# Patient Record
Sex: MALE | Race: OTHER
[De-identification: names, ages, dates, MRNs, and addresses within clinical notes are randomized per-mention and may not be internally consistent; named-entity substitution may affect disease eponyms.]

---

## 2018-06-01 ENCOUNTER — HOSPITAL ENCOUNTER (OUTPATIENT)
Dept: HOSPITAL 93 - MRI | Age: 83
Discharge: HOME | End: 2018-06-01
Attending: PSYCHIATRY & NEUROLOGY
Payer: COMMERCIAL

## 2018-06-01 DIAGNOSIS — I63.039: Primary | ICD-10-CM

## 2018-06-01 PROCEDURE — 70551 MRI BRAIN STEM W/O DYE: CPT

## 2019-03-02 ENCOUNTER — HOSPITAL ENCOUNTER (EMERGENCY)
Dept: HOSPITAL 93 - ER | Age: 84
Discharge: HOME | End: 2019-03-02
Payer: COMMERCIAL

## 2019-03-02 VITALS — WEIGHT: 142 LBS | BODY MASS INDEX: 24.24 KG/M2 | HEIGHT: 64 IN

## 2019-03-02 DIAGNOSIS — R10.31: Primary | ICD-10-CM

## 2020-01-14 ENCOUNTER — HOSPITAL ENCOUNTER (OUTPATIENT)
Dept: HOSPITAL 93 - TOM | Age: 85
Discharge: HOME | End: 2020-01-14
Attending: INTERNAL MEDICINE
Payer: COMMERCIAL

## 2020-01-14 DIAGNOSIS — R41.2: ICD-10-CM

## 2020-01-14 DIAGNOSIS — R51: ICD-10-CM

## 2020-01-14 DIAGNOSIS — R42: Primary | ICD-10-CM

## 2025-03-18 ENCOUNTER — HOSPITAL ENCOUNTER (INPATIENT)
Dept: HOSPITAL 93 - ER | Age: OVER 89
LOS: 4 days | Discharge: HOME | DRG: 812 | End: 2025-03-22
Attending: GENERAL PRACTICE | Admitting: GENERAL PRACTICE
Payer: COMMERCIAL

## 2025-03-18 VITALS — OXYGEN SATURATION: 100 %

## 2025-03-18 VITALS — BODY MASS INDEX: 22.16 KG/M2 | WEIGHT: 133 LBS | HEIGHT: 65 IN

## 2025-03-18 VITALS — OXYGEN SATURATION: 96 %

## 2025-03-18 DIAGNOSIS — D53.9: ICD-10-CM

## 2025-03-18 DIAGNOSIS — I10: ICD-10-CM

## 2025-03-18 DIAGNOSIS — N20.0: ICD-10-CM

## 2025-03-18 DIAGNOSIS — I48.0: ICD-10-CM

## 2025-03-18 DIAGNOSIS — B95.2: ICD-10-CM

## 2025-03-18 DIAGNOSIS — D69.6: ICD-10-CM

## 2025-03-18 DIAGNOSIS — E03.9: ICD-10-CM

## 2025-03-18 DIAGNOSIS — Z91.81: ICD-10-CM

## 2025-03-18 DIAGNOSIS — D64.9: Primary | ICD-10-CM

## 2025-03-18 DIAGNOSIS — D53.1: ICD-10-CM

## 2025-03-18 DIAGNOSIS — E87.6: ICD-10-CM

## 2025-03-18 DIAGNOSIS — R55: ICD-10-CM

## 2025-03-18 DIAGNOSIS — N39.0: ICD-10-CM

## 2025-03-18 DIAGNOSIS — J90: ICD-10-CM

## 2025-03-18 LAB
ALBUMIN SERPL-MCNC: 3 GM/DL (ref 3.4–5)
ALP SERPL-CCNC: 54 U/L (ref 50–136)
ALT SERPL-CCNC: 15 U/L (ref 12–78)
ANION GAP SERPL CALC-SCNC: 9 MMOL/L (ref 10–20)
APTT PPP: 25.4 SECONDS (ref 22–34)
BACTERIA UR QL AUTO: 412.2 UL (ref 0–1933)
BASOPHILS NFR BLD AUTO: 0.2 % (ref 0–1.5)
BILIRUB DIRECT SERPL-MCNC: 0.22 MG/DL (ref 0–0.2)
BILIRUB SERPL-MCNC: 0.58 MG/DL (ref 0.3–1.2)
BUN SERPL-MCNC: 17 MG/DL (ref 7–18)
BUN/CREAT SERPL: 17 (ref 7–25)
CALCIUM SERPL-MCNC: 8.5 MG/DL (ref 8.5–10.1)
CASTS # UR AUTO: 1.62 UL (ref 0–1.4)
CHLORIDE SERPL-SCNC: 112 MMOL/L (ref 98–107)
CO2 SERPL-SCNC: 25 MEQ/L (ref 21–32)
CREAT SERPL-MCNC: 1.01 MG/DL (ref 0.7–1.3)
EGFRCR SERPLBLD CKD-EPI 2021: 69.55 ML/MIN/{1.73_M2}
EOSINOPHIL NFR BLD AUTO: 0.2 % (ref 0–7)
EPI CELLS URNS QL MICRO: 9 UL (ref 0–38.8)
ERYTHROCYTE [DISTWIDTH] IN BLOOD BY AUTOMATED COUNT: 19.1 % (ref 11.5–14.5)
GLOBULIN SER CALC-MCNC: 3.2 G/DL (ref 2.4–3.5)
GLUCOSE SERPL-MCNC: 104 MG/DL (ref 65–100)
HCT VFR BLD AUTO: 13.7 % (ref 39–48)
HGB BLD-MCNC: 4.6 G/DL (ref 13–16)
INR PPP: 1.21
LYMPHOCYTES NFR BLD AUTO: 86.6 % (ref 12–44)
MCH RBC QN AUTO: 36.8 PG (ref 27–32)
MCHC RBC AUTO-ENTMCNC: 33.9 G/DL (ref 32–36)
MCV RBC AUTO: 109 FL (ref 80–100)
MONOCYTES NFR BLD AUTO: 4.1 % (ref 2–10)
NEUTROPHILS NFR BLD AUTO: 8.9 % (ref 47–76)
OSMOLALITY SERPL: 285 MOSM/KG (ref 275–295)
PH UR: 6 [PH] (ref 5–8)
PLATELET # BLD AUTO: 141 K/UL (ref 150–450)
PMV BLD AUTO: 8.7 FL (ref 7.2–11.1)
POTASSIUM SERPL-SCNC: 3.88 MEQ/L (ref 3.5–5.1)
PROT SERPL-MCNC: 6.2 GM/DL (ref 6.4–8.2)
PROTHROMBIN TIME: 13 SECONDS (ref 9–11.5)
RBC # BLD AUTO: 1.25 M/UL (ref 4–6)
RBC #/AREA URNS AUTO: 1.3 UL (ref 0–20.8)
SCC AG SERPL-SCNC: 16 U/L (ref 15–37)
SODIUM SERPL-SCNC: 142 MMOL/L (ref 136–145)
SP GR UR STRIP: 1.01 (ref 1–1.03)
UROBILINOGEN UR STRIP-MCNC: 1 E.U./DL
WBC # BLD AUTO: 10.9 K/UL (ref 4.5–11)
WBC URNS QL MICRO: 146.4 UL (ref 0–23.2)

## 2025-03-18 PROCEDURE — 30233N1 TRANSFUSION OF NONAUTOLOGOUS RED BLOOD CELLS INTO PERIPHERAL VEIN, PERCUTANEOUS APPROACH: ICD-10-PCS | Performed by: GENERAL PRACTICE

## 2025-03-18 PROCEDURE — BW28ZZZ COMPUTERIZED TOMOGRAPHY (CT SCAN) OF HEAD: ICD-10-PCS | Performed by: INTERNAL MEDICINE

## 2025-03-18 NOTE — NUR
SE ORIENTA A PACIENTE SOBRE TRATAMIENTO MEDICO, REFIERE ENTENDER. SE COLECTAN
MUESTRAS DE LABORATORIO Y SE CANALIZA A PACIENTE BAJO MEDIDAS ASEPTICAS. SE
ENTREGA ENVASE PARA U/A. SE NOTIFICA ESTUDIO ECHO PENDIENTE.

## 2025-03-18 NOTE — NUR
SE PADMINI CONSENTIMIENTO DE TRANSFUCION DE TRANSFUCION MD FIRMA CONSENTIMIENTO DE
TRANSFUCION. SE REQUISA 4 UNIDADES DE PRBC. SE PADMINI TUBOS PILOS. SE CANALIZA EN
DANIEL KHAN # 20. SE ORIENTA A FAMILIAR SOBRE EL PROCESO DE TRANSFUCION Y
ESTOS REFIERE ENTENDER

## 2025-03-18 NOTE — NUR
PACIENTE ALERTA Y ORIENTADA X3, SE ORIENTA SOBRE TRATAMIENTO MEDICO INDICA
ENTENDER Y ACEPTAR. SE REALIZA CANALIZACION BAJO MEDIDAS ASEPTICAS Y SE
ADMINISTRA MEDICAMENTO JESENIA ORDEN MEDICA.

## 2025-03-18 NOTE — NUR
PACIENTE ALERTA Y ORIENTADO X3.PACIENTE REFIERE MAREOS Y DEVILIDA CONTASTE.S/V
ESTABLE DENDRO DE VU CONDICION .SE LE REALZA EKG .PACIENTE EN ESPERA DE
EVALUACION MEDICA.

## 2025-03-19 VITALS — OXYGEN SATURATION: 98 %

## 2025-03-19 VITALS — OXYGEN SATURATION: 100 %

## 2025-03-19 VITALS — OXYGEN SATURATION: 90 %

## 2025-03-19 LAB
CHOLEST SERPL-MCNC: 84 MG/DL (ref 0–200)
CHOLEST/HDLC SERPL: 2 {RATIO} (ref 0–5)
HDLC SERPL-MCNC: 43 MG/DL (ref 40–60)
LDLC SERPL CALC-MCNC: 28 MG/DL (ref 0–130)
TRIGL SERPL-MCNC: 67 MG/DL (ref 0–150)
TSH SERPL-ACNC: 1.2 UIU/ML (ref 0.36–3.74)
VLDLC SERPL CALC-MCNC: 13 MG/DL (ref 0–39)

## 2025-03-19 PROCEDURE — BW21YZZ COMPUTERIZED TOMOGRAPHY (CT SCAN) OF ABDOMEN AND PELVIS USING OTHER CONTRAST: ICD-10-PCS | Performed by: GENERAL PRACTICE

## 2025-03-19 PROCEDURE — B24BZZZ ULTRASONOGRAPHY OF HEART WITH AORTA: ICD-10-PCS | Performed by: INTERNAL MEDICINE

## 2025-03-19 PROCEDURE — 4A12X4Z MONITORING OF CARDIAC ELECTRICAL ACTIVITY, EXTERNAL APPROACH: ICD-10-PCS | Performed by: GENERAL PRACTICE

## 2025-03-20 VITALS — OXYGEN SATURATION: 95 %

## 2025-03-20 VITALS — OXYGEN SATURATION: 94 %

## 2025-03-20 VITALS — OXYGEN SATURATION: 93 %

## 2025-03-20 VITALS — OXYGEN SATURATION: 90 %

## 2025-03-20 VITALS — OXYGEN SATURATION: 97 %

## 2025-03-20 LAB
ALBUMIN SERPL-MCNC: 2.8 GM/DL (ref 3.4–5)
ALP SERPL-CCNC: 44 U/L (ref 50–136)
ALT SERPL-CCNC: 28 U/L (ref 12–78)
ANION GAP SERPL CALC-SCNC: 10 MMOL/L (ref 10–20)
BASOPHILS NFR BLD AUTO: 0.1 % (ref 0–1.5)
BILIRUB SERPL-MCNC: 1.03 MG/DL (ref 0.3–1.2)
BUN SERPL-MCNC: 17 MG/DL (ref 7–18)
BUN/CREAT SERPL: 19 (ref 7–25)
CALCIUM SERPL-MCNC: 8.1 MG/DL (ref 8.5–10.1)
CHLORIDE SERPL-SCNC: 105 MMOL/L (ref 98–107)
CO2 SERPL-SCNC: 25 MEQ/L (ref 21–32)
CREAT SERPL-MCNC: 0.91 MG/DL (ref 0.7–1.3)
EGFRCR SERPLBLD CKD-EPI 2021: 78.45 ML/MIN/{1.73_M2}
EOSINOPHIL NFR BLD AUTO: 0 % (ref 0–7)
ERYTHROCYTE [DISTWIDTH] IN BLOOD BY AUTOMATED COUNT: 19.1 % (ref 11.5–14.5)
GLOBULIN SER CALC-MCNC: 2.7 G/DL (ref 2.4–3.5)
GLUCOSE SERPL-MCNC: 120 MG/DL (ref 65–100)
HCT VFR BLD AUTO: 24.4 % (ref 39–48)
HEMOCCULT STL QL IA: POSITIVE
HGB BLD-MCNC: 8.5 G/DL (ref 13–16)
LYMPHOCYTES NFR BLD AUTO: 52.8 % (ref 12–44)
MAGNESIUM SERPL-MCNC: 1.5 MG/DL (ref 1.8–2.4)
MCH RBC QN AUTO: 32.9 PG (ref 27–32)
MCHC RBC AUTO-ENTMCNC: 34.9 G/DL (ref 32–36)
MCV RBC AUTO: 94.5 FL (ref 80–100)
MONOCYTES NFR BLD AUTO: 5.1 % (ref 2–10)
NEUTROPHILS NFR BLD AUTO: 42 % (ref 47–76)
OSMOLALITY SERPL: 277 MOSM/KG (ref 275–295)
PHOSPHATE SERPL-MCNC: 3.4 MG/DL (ref 2.5–4.9)
PLATELET # BLD AUTO: 123 K/UL (ref 150–450)
PMV BLD AUTO: 9 FL (ref 7.2–11.1)
POTASSIUM SERPL-SCNC: 3.41 MEQ/L (ref 3.5–5.1)
PROT SERPL-MCNC: 5.5 GM/DL (ref 6.4–8.2)
RBC # BLD AUTO: 2.58 M/UL (ref 4–6)
SCC AG SERPL-SCNC: 57 U/L (ref 15–37)
SODIUM SERPL-SCNC: 137 MMOL/L (ref 136–145)
WBC # BLD AUTO: 7.1 K/UL (ref 4.5–11)

## 2025-03-21 VITALS — OXYGEN SATURATION: 99 %

## 2025-03-21 VITALS — OXYGEN SATURATION: 100 %

## 2025-03-21 VITALS — OXYGEN SATURATION: 97 %

## 2025-03-21 LAB
BASOPHILS NFR BLD AUTO: 0.2 % (ref 0–1.5)
EOSINOPHIL NFR BLD AUTO: 0.1 % (ref 0–7)
ERYTHROCYTE [DISTWIDTH] IN BLOOD BY AUTOMATED COUNT: 17.6 % (ref 11.5–14.5)
FSH SMN-SCNC: 7.9 NG/ML (ref 4.78–20)
HCT VFR BLD AUTO: 32.7 % (ref 39–48)
HGB BLD-MCNC: 11.4 G/DL (ref 13–16)
LYMPHOCYTES NFR BLD AUTO: 51 %
LYMPHOCYTES NFR BLD AUTO: 60.7 % (ref 12–44)
MCH RBC QN AUTO: 32.5 PG (ref 27–32)
MCHC RBC AUTO-ENTMCNC: 34.9 G/DL (ref 32–36)
MCV RBC AUTO: 92.9 FL (ref 80–100)
MONOCYTES NFR BLD AUTO: 6 %
MONOCYTES NFR BLD AUTO: 6.1 % (ref 2–10)
NEUTROPHILS NFR BLD AUTO: 32.9 % (ref 47–76)
NEUTROPHILS NFR BLD AUTO: 41 %
PLATELET # BLD AUTO: 103 K/UL (ref 150–450)
PLATELET # BLD EST: (no result) 10*3/UL
PMV BLD AUTO: 8.6 FL (ref 7.2–11.1)
RBC # BLD AUTO: 3.52 M/UL (ref 4–6)
VIT B12 SERPL-MCNC: 197 PG/ML (ref 232–1245)
WBC # BLD AUTO: 7.5 K/UL (ref 4.5–11)

## 2025-03-22 VITALS — OXYGEN SATURATION: 97 %

## 2025-03-22 LAB
ALBUMIN SERPL-MCNC: 2.6 GM/DL (ref 3.4–5)
ALP SERPL-CCNC: 48 U/L (ref 50–136)
ALT SERPL-CCNC: 29 U/L (ref 12–78)
ANION GAP SERPL CALC-SCNC: 9 MMOL/L (ref 10–20)
BASOPHILS NFR BLD AUTO: 0.2 % (ref 0–1.5)
BILIRUB SERPL-MCNC: 0.83 MG/DL (ref 0.3–1.2)
BUN SERPL-MCNC: 15 MG/DL (ref 7–18)
BUN/CREAT SERPL: 15 (ref 7–25)
CALCIUM SERPL-MCNC: 8 MG/DL (ref 8.5–10.1)
CHLORIDE SERPL-SCNC: 106 MMOL/L (ref 98–107)
CO2 SERPL-SCNC: 28 MEQ/L (ref 21–32)
CREAT SERPL-MCNC: 0.97 MG/DL (ref 0.7–1.3)
EGFRCR SERPLBLD CKD-EPI 2021: 72.87 ML/MIN/{1.73_M2}
EOSINOPHIL NFR BLD AUTO: 0.4 % (ref 0–7)
ERYTHROCYTE [DISTWIDTH] IN BLOOD BY AUTOMATED COUNT: 17.7 % (ref 11.5–14.5)
GLOBULIN SER CALC-MCNC: 2.9 G/DL (ref 2.4–3.5)
GLUCOSE SERPL-MCNC: 92 MG/DL (ref 65–100)
HCT VFR BLD AUTO: 32.8 % (ref 39–48)
HGB BLD-MCNC: 11.7 G/DL (ref 13–16)
LYMPHOCYTES NFR BLD AUTO: 68.3 % (ref 12–44)
MAGNESIUM SERPL-MCNC: 2.1 MG/DL (ref 1.8–2.4)
MCH RBC QN AUTO: 32.7 PG (ref 27–32)
MCHC RBC AUTO-ENTMCNC: 35.7 G/DL (ref 32–36)
MCV RBC AUTO: 91.7 FL (ref 80–100)
MONOCYTES NFR BLD AUTO: 5.9 % (ref 2–10)
NEUTROPHILS NFR BLD AUTO: 25.2 % (ref 47–76)
OSMOLALITY SERPL: 278 MOSM/KG (ref 275–295)
PHOSPHATE SERPL-MCNC: 2.9 MG/DL (ref 2.5–4.9)
PLATELET # BLD AUTO: 103 K/UL (ref 150–450)
PMV BLD AUTO: 9.5 FL (ref 7.2–11.1)
POTASSIUM SERPL-SCNC: 3.65 MEQ/L (ref 3.5–5.1)
PROT SERPL-MCNC: 5.5 GM/DL (ref 6.4–8.2)
RBC # BLD AUTO: 3.58 M/UL (ref 4–6)
SCC AG SERPL-SCNC: 38 U/L (ref 15–37)
SODIUM SERPL-SCNC: 139 MMOL/L (ref 136–145)
WBC # BLD AUTO: 7.8 K/UL (ref 4.5–11)

## 2025-04-17 ENCOUNTER — HOSPITAL ENCOUNTER (INPATIENT)
Dept: HOSPITAL 93 - ER | Age: OVER 89
LOS: 2 days | Discharge: HOME | DRG: 812 | End: 2025-04-19
Attending: GENERAL PRACTICE | Admitting: GENERAL PRACTICE
Payer: COMMERCIAL

## 2025-04-17 VITALS — WEIGHT: 133 LBS | BODY MASS INDEX: 22.16 KG/M2 | HEIGHT: 65 IN

## 2025-04-17 DIAGNOSIS — D64.9: Primary | ICD-10-CM

## 2025-04-17 DIAGNOSIS — I10: ICD-10-CM

## 2025-04-17 LAB
ALBUMIN SERPL-MCNC: 3.3 GM/DL (ref 3.4–5)
APTT PPP: 26.8 SECONDS (ref 22–34)
BACTERIA UR QL AUTO: 532.3 UL (ref 0–1933)
BILIRUB SERPL-MCNC: 0.53 MG/DL (ref 0.3–1.2)
CALCIUM SERPL-MCNC: 8.6 MG/DL (ref 8.5–10.1)
CASTS # UR AUTO: 1.32 UL (ref 0–1.4)
CREAT SERPL-MCNC: 0.87 MG/DL (ref 0.7–1.3)
EGFRCR SERPLBLD CKD-EPI 2021: 82.62 ML/MIN/{1.73_M2}
EPI CELLS URNS QL MICRO: 8.3 UL (ref 0–38.8)
ERYTHROCYTE [DISTWIDTH] IN BLOOD BY AUTOMATED COUNT: 17.8 % (ref 11.5–14.5)
GLOBULIN SER CALC-MCNC: 3.4 G/DL (ref 2.4–3.5)
HCT VFR BLD AUTO: 21.4 % (ref 39–48)
HGB BLD-MCNC: 7.3 G/DL (ref 13–16)
INR PPP: 1.11
MCH RBC QN AUTO: 31.7 PG (ref 27–32)
MCV RBC AUTO: 93.2 FL (ref 80–100)
PLATELET # BLD AUTO: 177 K/UL (ref 150–450)
PROT SERPL-MCNC: 6.7 GM/DL (ref 6.4–8.2)
RBC #/AREA URNS AUTO: 1.1 UL (ref 0–20.8)
SP GR UR STRIP: 1.01 (ref 1–1.03)
WBC URNS QL MICRO: 274.5 UL (ref 0–23.2)

## 2025-04-17 PROCEDURE — BW21ZZZ COMPUTERIZED TOMOGRAPHY (CT SCAN) OF ABDOMEN AND PELVIS: ICD-10-PCS | Performed by: INTERNAL MEDICINE

## 2025-04-18 VITALS — OXYGEN SATURATION: 98 %

## 2025-04-18 VITALS — OXYGEN SATURATION: 97 %

## 2025-04-18 LAB
ERYTHROCYTE [DISTWIDTH] IN BLOOD BY AUTOMATED COUNT: 17.4 % (ref 11.5–14.5)
HCT VFR BLD AUTO: 18.6 % (ref 39–48)
HGB BLD-MCNC: 6.5 G/DL (ref 13–16)
MCH RBC QN AUTO: 32.3 PG (ref 27–32)
MCHC RBC AUTO-ENTMCNC: 34.9 G/DL (ref 32–36)
MCV RBC AUTO: 92.5 FL (ref 80–100)
PLATELET # BLD AUTO: 171 K/UL (ref 150–450)
RBC # BLD AUTO: 2.01 M/UL (ref 4–6)

## 2025-04-19 VITALS — OXYGEN SATURATION: 94 %

## 2025-04-19 VITALS — OXYGEN SATURATION: 92 %

## 2025-04-19 LAB
ERYTHROCYTE [DISTWIDTH] IN BLOOD BY AUTOMATED COUNT: 16.4 % (ref 11.5–14.5)
HEMOCCULT STL QL IA: NEGATIVE
HGB BLD-MCNC: 11.1 G/DL (ref 13–16)
MCH RBC QN AUTO: 30.3 PG (ref 27–32)
MCHC RBC AUTO-ENTMCNC: 34.5 G/DL (ref 32–36)
MCV RBC AUTO: 87.8 FL (ref 80–100)
PLATELET # BLD AUTO: 162 K/UL (ref 150–450)
RBC # BLD AUTO: 3.65 M/UL (ref 4–6)

## 2025-04-21 LAB — FSH SMN-SCNC: 19.4 NG/ML (ref 4.78–20)

## 2025-05-13 ENCOUNTER — HOSPITAL ENCOUNTER (INPATIENT)
Dept: HOSPITAL 93 - ER | Age: OVER 89
LOS: 3 days | Discharge: HOME | DRG: 841 | End: 2025-05-16
Attending: GENERAL PRACTICE | Admitting: GENERAL PRACTICE
Payer: COMMERCIAL

## 2025-05-13 DIAGNOSIS — N39.0: ICD-10-CM

## 2025-05-13 DIAGNOSIS — E78.5: ICD-10-CM

## 2025-05-13 DIAGNOSIS — I10: ICD-10-CM

## 2025-05-13 DIAGNOSIS — C91.Z0: Primary | ICD-10-CM

## 2025-05-13 DIAGNOSIS — E03.9: ICD-10-CM

## 2025-05-13 DIAGNOSIS — D64.89: ICD-10-CM

## 2025-05-13 LAB
ALBUMIN SERPL-MCNC: 3.5 GM/DL (ref 3.4–5)
APTT PPP: 26.1 SECONDS (ref 22–34)
BASO %: 0.1 % (ref 0.1–1.2)
BILIRUB SERPL-MCNC: 0.46 MG/DL (ref 0.3–1.2)
CALCIUM SERPL-MCNC: 8.5 MG/DL (ref 8.5–10.1)
CREAT SERPL-MCNC: 0.86 MG/DL (ref 0.7–1.3)
EGFRCR SERPLBLD CKD-EPI 2021: 83.73 ML/MIN/{1.73_M2}
EOS #: 0.12 (ref 0.04–0.54)
EOS %: 1.7 % (ref 0.7–7)
GLOBULIN SER CALC-MCNC: 3.7 G/DL (ref 2.4–3.5)
HEMATOCRIT: 20.7 % (ref 40.1–51)
HEMOGLOBIN: 6.9 G/DL (ref 13.7–17.5)
INR PPP: 1.13
LYMPH #: 4.73 (ref 1.18–3.74)
LYMPH %: 68.8 % (ref 19.3–53.1)
MEAN CORPUSCULAR HEMOGLOBIN: 28.9 PG (ref 25.6–32.2)
MONO #: 0.29 (ref 0.24–0.82)
MONO %: 4.2 % (ref 4.7–12.5)
NEUT #: 1.69 (ref 1.56–6.13)
NEUT %: 24.6 % (ref 34–71.1)
PLATELET COUNT: 173 K/UL (ref 163–369)
POTASSIUM SERPL-SCNC: 4.2 MEQ/L (ref 3.5–5.1)
PROT SERPL-MCNC: 7.2 GM/DL (ref 6.4–8.2)
PROTHROMBIN TIME: 12.2 SECONDS (ref 9–11.5)
RED BLOOD COUNT: 2.39 M/UL (ref 4.63–6.08)
RED CELL DISTRIBUTION WIDTH: 16.3 % (ref 11.6–14.4)

## 2025-05-14 VITALS — OXYGEN SATURATION: 96 %

## 2025-05-14 VITALS — OXYGEN SATURATION: 98 %

## 2025-05-14 LAB
BACTERIA UR QL AUTO: 204.1 UL (ref 0–1933)
EPI CELLS URNS QL MICRO: 1.2 UL (ref 0–38.8)
HEMOCCULT STL QL IA: NEGATIVE
RBC #/AREA URNS AUTO: 0.2 UL (ref 0–20.8)
SP GR UR STRIP: 1 (ref 1–1.03)
WBC URNS QL MICRO: 27.3 UL (ref 0–23.2)

## 2025-05-14 PROCEDURE — 30233N1 TRANSFUSION OF NONAUTOLOGOUS RED BLOOD CELLS INTO PERIPHERAL VEIN, PERCUTANEOUS APPROACH: ICD-10-PCS | Performed by: GENERAL PRACTICE

## 2025-05-15 VITALS — OXYGEN SATURATION: 97 %

## 2025-05-15 VITALS — OXYGEN SATURATION: 96 %

## 2025-05-15 VITALS — OXYGEN SATURATION: 91 %

## 2025-05-16 VITALS — OXYGEN SATURATION: 94 %

## 2025-05-16 VITALS — OXYGEN SATURATION: 96 %

## 2025-05-16 LAB
BASO %: 0.2 % (ref 0.1–1.2)
EOS #: 0.33 (ref 0.04–0.54)
EOS %: 3.7 % (ref 0.7–7)
HEMOGLOBIN: 11.2 G/DL (ref 13.7–17.5)
LYMPH #: 6.29 (ref 1.18–3.74)
LYMPH %: 70.5 % (ref 19.3–53.1)
MEAN CORPUSCULAR HEMOGLOBIN: 28.9 PG (ref 25.6–32.2)
MONO #: 0.47 (ref 0.24–0.82)
MONO %: 5.3 % (ref 4.7–12.5)
NEUT #: 1.79 (ref 1.56–6.13)
NEUT %: 20.1 % (ref 34–71.1)
PLATELET COUNT: 151 K/UL (ref 163–369)
RED BLOOD COUNT: 3.87 M/UL (ref 4.63–6.08)
RED CELL DISTRIBUTION WIDTH: 15.5 % (ref 11.6–14.4)

## 2025-06-13 ENCOUNTER — HOSPITAL ENCOUNTER (INPATIENT)
Dept: HOSPITAL 93 - ER | Age: OVER 89
LOS: 5 days | Discharge: HOME | DRG: 842 | End: 2025-06-18
Attending: INTERNAL MEDICINE | Admitting: INTERNAL MEDICINE
Payer: COMMERCIAL

## 2025-06-13 VITALS — BODY MASS INDEX: 20.83 KG/M2 | WEIGHT: 125 LBS | HEIGHT: 65 IN

## 2025-06-13 VITALS — SYSTOLIC BLOOD PRESSURE: 139 MMHG | OXYGEN SATURATION: 96 % | DIASTOLIC BLOOD PRESSURE: 67 MMHG

## 2025-06-13 VITALS — DIASTOLIC BLOOD PRESSURE: 76 MMHG | SYSTOLIC BLOOD PRESSURE: 138 MMHG | OXYGEN SATURATION: 97 %

## 2025-06-13 DIAGNOSIS — C91.Z0: Primary | ICD-10-CM

## 2025-06-13 DIAGNOSIS — R31.0: ICD-10-CM

## 2025-06-13 DIAGNOSIS — E78.5: ICD-10-CM

## 2025-06-13 DIAGNOSIS — I10: ICD-10-CM

## 2025-06-13 DIAGNOSIS — D64.89: ICD-10-CM

## 2025-06-13 DIAGNOSIS — R05.9: ICD-10-CM

## 2025-06-13 DIAGNOSIS — E03.9: ICD-10-CM

## 2025-06-13 LAB
ANION GAP SERPL CALC-SCNC: 5 MMOL/L (ref 10–20)
APPEARANCE UR: CLEAR
BACTERIA UR QL AUTO: (no result)
BACTERIA UR QL AUTO: 520 UL (ref 0–1933)
BASOPHILS NFR BLD AUTO: 0.2 % (ref 0.1–1.2)
BASOPHILS NFR BLD: (no result) %
BILIRUB UR QL STRIP: NEGATIVE
BLASTS NFR BLD: (no result) %
BUN SERPL-MCNC: 12 MG/DL (ref 7–18)
BUN/CREAT SERPL: 14 (ref 7–25)
CALCIUM SERPL-MCNC: 8.8 MG/DL (ref 8.5–10.1)
CASTS # UR AUTO: 1.91 UL (ref 0–1.4)
CELLS [TYPE] IN URINE SEDIMENT BY LIGHT MICROSCOPY: (no result)
CHLORIDE SERPL-SCNC: 110 MMOL/L (ref 98–107)
CO2 SERPL-SCNC: 30 MEQ/L (ref 21–32)
COLOR UR: YELLOW
CREAT SERPL-MCNC: 0.84 MG/DL (ref 0.7–1.3)
CRYSTALS UR QL: (no result) /HPF
EGFRCR SERPLBLD CKD-EPI 2021: 86.04 ML/MIN/{1.73_M2}
EOSINOPHIL # BLD AUTO: 0.09 10*3/UL (ref 0.04–0.54)
EOSINOPHIL NFR BLD AUTO: 1.9 % (ref 0.7–7)
EOSINOPHIL NFR BLD: (no result) %
EPI CELLS URNS QL MICRO: (no result) /HPF
EPI CELLS URNS QL MICRO: 4.2 UL (ref 0–38.8)
ERYTHROCYTE [DISTWIDTH] IN BLOOD BY AUTOMATED COUNT: 16.3 % (ref 11.6–14.4)
GLUCOSE SERPL-MCNC: 94 MG/DL (ref 65–100)
GLUCOSE UR STRIP-MCNC: NEGATIVE MG/DL
HCT VFR BLD AUTO: 20.3 % (ref 40.1–51)
HGB BLD-MCNC: 6.7 G/DL (ref 13.7–17.5)
KETONES UR QL: NEGATIVE
LEUKOCYTE ESTERASE UR QL STRIP: NEGATIVE
LYMPHOCYTES # BLD AUTO: 3.05 10*3/UL (ref 1.18–3.74)
LYMPHOCYTES NFR BLD AUTO: 63 % (ref 19.3–53.1)
Lab: NEGATIVE
MCH RBC QN AUTO: 28.4 PG (ref 25.6–32.2)
METAMYELOCYTES NFR BLD: (no result) %
MONOCYTES # BLD AUTO: 0.34 10*3/UL (ref 0.24–0.82)
MONOCYTES NFR BLD AUTO: 7 % (ref 4.7–12.5)
MONOCYTES NFR BLD: (no result) %
MUCOUS THREADS #/AREA URNS LPF: (no result) /[LPF]
MYELOCYTES NFR BLD: (no result) %
NEUTROPHILS # BLD AUTO: 1.33 10*3/UL (ref 1.56–6.13)
NEUTROPHILS NFR BLD AUTO: 27.5 % (ref 34–71.1)
NEUTS BAND NFR BLD: (no result) %
NEUTS SEG NFR BLD: (no result) %
NITRITE UR STRIP-MCNC: NEGATIVE MG/DL
OSMOLALITY SERPL: 281 MOSM/KG (ref 275–295)
PH UR: (no result) [PH] (ref 5–8)
PH UR: 7 [PH]
PLASMACOID LYMPHS NFR BLD: (no result) %
PLATELET # BLD AUTO: 195 K/UL (ref 163–369)
PMV BLD AUTO: 10.8 FL (ref 9.4–12.4)
POTASSIUM SERPL-SCNC: 4.33 MEQ/L (ref 3.5–5.1)
PROLYMPHOCYTES NFR BLD: (no result) %
PROT UR STRIP-MCNC: NEGATIVE MG/DL
RBC # BLD AUTO: 2.36 M/UL (ref 4.63–6.08)
RBC # UR STRIP: NEGATIVE /UL
RBC #/AREA URNS AUTO: (no result) /HPF
RBC #/AREA URNS AUTO: 0.7 UL (ref 0–20.8)
SODIUM SERPL-SCNC: 141 MMOL/L (ref 136–145)
SP GR UR STRIP: <= 1.005 (ref 1–1.03)
SPERM URNS QL MICRO: (no result)
TRICHOMONAS #/AREA URNS HPF: (no result) /[HPF]
UROBILINOGEN UR STRIP-MCNC: 0.2 E.U./DL
WBC MORPH BLD: (no result)
WBC URNS QL MICRO: (no result) /HPF
WBC URNS QL MICRO: 99.7 UL (ref 0–23.2)
YEAST #/AREA URNS HPF: (no result) /HPF

## 2025-06-13 PROCEDURE — 8E0ZXY6 ISOLATION: ICD-10-PCS | Performed by: INTERNAL MEDICINE

## 2025-06-13 PROCEDURE — 30233N1 TRANSFUSION OF NONAUTOLOGOUS RED BLOOD CELLS INTO PERIPHERAL VEIN, PERCUTANEOUS APPROACH: ICD-10-PCS | Performed by: INTERNAL MEDICINE

## 2025-06-14 VITALS — SYSTOLIC BLOOD PRESSURE: 121 MMHG | OXYGEN SATURATION: 95 % | DIASTOLIC BLOOD PRESSURE: 66 MMHG

## 2025-06-14 VITALS — OXYGEN SATURATION: 97 % | SYSTOLIC BLOOD PRESSURE: 134 MMHG | DIASTOLIC BLOOD PRESSURE: 65 MMHG

## 2025-06-14 VITALS — OXYGEN SATURATION: 98 % | DIASTOLIC BLOOD PRESSURE: 88 MMHG | SYSTOLIC BLOOD PRESSURE: 155 MMHG

## 2025-06-15 VITALS — OXYGEN SATURATION: 96 % | SYSTOLIC BLOOD PRESSURE: 160 MMHG | DIASTOLIC BLOOD PRESSURE: 65 MMHG

## 2025-06-15 VITALS — DIASTOLIC BLOOD PRESSURE: 75 MMHG | SYSTOLIC BLOOD PRESSURE: 153 MMHG | OXYGEN SATURATION: 94 %

## 2025-06-15 VITALS — SYSTOLIC BLOOD PRESSURE: 158 MMHG | DIASTOLIC BLOOD PRESSURE: 69 MMHG | OXYGEN SATURATION: 95 %

## 2025-06-15 PROCEDURE — BT43ZZZ ULTRASONOGRAPHY OF BILATERAL KIDNEYS: ICD-10-PCS | Performed by: INTERNAL MEDICINE

## 2025-06-16 VITALS — SYSTOLIC BLOOD PRESSURE: 100 MMHG | OXYGEN SATURATION: 94 % | DIASTOLIC BLOOD PRESSURE: 61 MMHG

## 2025-06-16 VITALS — DIASTOLIC BLOOD PRESSURE: 81 MMHG | OXYGEN SATURATION: 99 % | SYSTOLIC BLOOD PRESSURE: 152 MMHG

## 2025-06-16 VITALS — OXYGEN SATURATION: 96 % | SYSTOLIC BLOOD PRESSURE: 102 MMHG | DIASTOLIC BLOOD PRESSURE: 59 MMHG

## 2025-06-16 LAB
APPEARANCE UR: CLEAR
BACTERIA UR QL AUTO: (no result)
BACTERIA UR QL AUTO: 622.8 UL (ref 0–1933)
BASOPHILS NFR BLD AUTO: 0.4 % (ref 0.1–1.2)
BASOPHILS NFR BLD: (no result) %
BILIRUB UR QL STRIP: NEGATIVE
BLASTS NFR BLD: (no result) %
CASTS # UR AUTO: 0 UL (ref 0–1.4)
CELLS [TYPE] IN URINE SEDIMENT BY LIGHT MICROSCOPY: (no result)
COLOR UR: (no result)
CRYSTALS UR QL: (no result) /HPF
EOSINOPHIL # BLD AUTO: 0.01 10*3/UL (ref 0.04–0.54)
EOSINOPHIL NFR BLD AUTO: 0.1 % (ref 0.7–7)
EOSINOPHIL NFR BLD: (no result) %
EPI CELLS URNS QL MICRO: (no result) /HPF
EPI CELLS URNS QL MICRO: 2.6 UL (ref 0–38.8)
ERYTHROCYTE [DISTWIDTH] IN BLOOD BY AUTOMATED COUNT: 16.1 % (ref 11.6–14.4)
GLUCOSE UR STRIP-MCNC: NEGATIVE MG/DL
HCT VFR BLD AUTO: 36 % (ref 40.1–51)
HGB BLD-MCNC: 12.1 G/DL (ref 13.7–17.5)
KETONES UR QL: 15
LEUKOCYTE ESTERASE UR QL STRIP: (no result)
LYMPHOCYTES # BLD AUTO: 4.46 10*3/UL (ref 1.18–3.74)
LYMPHOCYTES NFR BLD AUTO: 48.5 % (ref 19.3–53.1)
MCH RBC QN AUTO: 28.5 PG (ref 25.6–32.2)
METAMYELOCYTES NFR BLD: (no result) %
MONOCYTES # BLD AUTO: 0.5 10*3/UL (ref 0.24–0.82)
MONOCYTES NFR BLD AUTO: 5.4 % (ref 4.7–12.5)
MONOCYTES NFR BLD: (no result) %
MUCOUS THREADS #/AREA URNS LPF: (no result) /[LPF]
MYELOCYTES NFR BLD: (no result) %
NEUTROPHILS # BLD AUTO: 4.08 10*3/UL (ref 1.56–6.13)
NEUTROPHILS NFR BLD AUTO: 44.5 % (ref 34–71.1)
NEUTS BAND NFR BLD: (no result) %
NEUTS SEG NFR BLD: (no result) %
NITRITE UR STRIP-MCNC: NEGATIVE MG/DL
PH UR: (no result) [PH]
PH UR: 6 [PH] (ref 5–8)
PLASMACOID LYMPHS NFR BLD: (no result) %
PLATELET # BLD AUTO: 123 K/UL (ref 163–369)
PMV BLD AUTO: 10.1 FL (ref 9.4–12.4)
PROLYMPHOCYTES NFR BLD: (no result) %
PROT UR STRIP-MCNC: 30 MG/DL
RBC # BLD AUTO: 4.25 M/UL (ref 4.63–6.08)
RBC # UR STRIP: (no result) /UL
RBC #/AREA URNS AUTO: (no result) /HPF
RBC #/AREA URNS AUTO: 2034.3 UL (ref 0–20.8)
SP GR UR STRIP: 1.02 (ref 1–1.03)
SPERM URNS QL MICRO: (no result)
TRICHOMONAS #/AREA URNS HPF: (no result) /[HPF]
UROBILINOGEN UR STRIP-MCNC: 1 E.U./DL
WBC MORPH BLD: (no result)
WBC URNS QL MICRO: (no result) /HPF
WBC URNS QL MICRO: 177.8 UL (ref 0–23.2)
YEAST #/AREA URNS HPF: (no result) /HPF

## 2025-06-16 PROCEDURE — 3E0F7GC INTRODUCTION OF OTHER THERAPEUTIC SUBSTANCE INTO RESPIRATORY TRACT, VIA NATURAL OR ARTIFICIAL OPENING: ICD-10-PCS | Performed by: INTERNAL MEDICINE

## 2025-06-17 VITALS — SYSTOLIC BLOOD PRESSURE: 116 MMHG | DIASTOLIC BLOOD PRESSURE: 62 MMHG | OXYGEN SATURATION: 96 %

## 2025-06-17 VITALS — DIASTOLIC BLOOD PRESSURE: 65 MMHG | OXYGEN SATURATION: 94 % | SYSTOLIC BLOOD PRESSURE: 109 MMHG

## 2025-06-17 VITALS — OXYGEN SATURATION: 96 % | DIASTOLIC BLOOD PRESSURE: 80 MMHG | SYSTOLIC BLOOD PRESSURE: 135 MMHG

## 2025-06-18 VITALS — OXYGEN SATURATION: 97 % | DIASTOLIC BLOOD PRESSURE: 68 MMHG | SYSTOLIC BLOOD PRESSURE: 115 MMHG

## 2025-06-18 VITALS — SYSTOLIC BLOOD PRESSURE: 100 MMHG | DIASTOLIC BLOOD PRESSURE: 64 MMHG | OXYGEN SATURATION: 95 %
